# Patient Record
Sex: MALE | Race: WHITE | NOT HISPANIC OR LATINO | URBAN - METROPOLITAN AREA
[De-identification: names, ages, dates, MRNs, and addresses within clinical notes are randomized per-mention and may not be internally consistent; named-entity substitution may affect disease eponyms.]

---

## 2017-06-22 ENCOUNTER — APPOINTMENT (RX ONLY)
Dept: URBAN - METROPOLITAN AREA CLINIC 12 | Facility: CLINIC | Age: 57
Setting detail: DERMATOLOGY
End: 2017-06-22

## 2017-06-22 DIAGNOSIS — D18.0 HEMANGIOMA: ICD-10-CM

## 2017-06-22 DIAGNOSIS — D22 MELANOCYTIC NEVI: ICD-10-CM

## 2017-06-22 DIAGNOSIS — L82.0 INFLAMED SEBORRHEIC KERATOSIS: ICD-10-CM

## 2017-06-22 DIAGNOSIS — L81.4 OTHER MELANIN HYPERPIGMENTATION: ICD-10-CM

## 2017-06-22 PROBLEM — D22.39 MELANOCYTIC NEVI OF OTHER PARTS OF FACE: Status: ACTIVE | Noted: 2017-06-22

## 2017-06-22 PROBLEM — D18.01 HEMANGIOMA OF SKIN AND SUBCUTANEOUS TISSUE: Status: ACTIVE | Noted: 2017-06-22

## 2017-06-22 PROBLEM — D22.72 MELANOCYTIC NEVI OF LEFT LOWER LIMB, INCLUDING HIP: Status: ACTIVE | Noted: 2017-06-22

## 2017-06-22 PROBLEM — D22.5 MELANOCYTIC NEVI OF TRUNK: Status: ACTIVE | Noted: 2017-06-22

## 2017-06-22 PROCEDURE — 11300 SHAVE SKIN LESION 0.5 CM/<: CPT

## 2017-06-22 PROCEDURE — ? LIQUID NITROGEN

## 2017-06-22 PROCEDURE — 17110 DESTRUCTION B9 LES UP TO 14: CPT

## 2017-06-22 PROCEDURE — 99213 OFFICE O/P EST LOW 20 MIN: CPT | Mod: 25

## 2017-06-22 PROCEDURE — ? SHAVE REMOVAL

## 2017-06-22 PROCEDURE — ? COUNSELING

## 2017-06-22 ASSESSMENT — LOCATION DETAILED DESCRIPTION DERM
LOCATION DETAILED: LEFT INFERIOR CENTRAL MALAR CHEEK
LOCATION DETAILED: EPIGASTRIC SKIN
LOCATION DETAILED: LEFT SUPERIOR FOREHEAD
LOCATION DETAILED: RIGHT SUPERIOR PARIETAL SCALP
LOCATION DETAILED: LEFT SUPERIOR PARIETAL SCALP
LOCATION DETAILED: RIGHT CENTRAL MALAR CHEEK
LOCATION DETAILED: SUPERIOR THORACIC SPINE
LOCATION DETAILED: LEFT POSTERIOR ANKLE
LOCATION DETAILED: STERNUM

## 2017-06-22 ASSESSMENT — LOCATION ZONE DERM
LOCATION ZONE: TRUNK
LOCATION ZONE: SCALP
LOCATION ZONE: LEG
LOCATION ZONE: FACE

## 2017-06-22 ASSESSMENT — LOCATION SIMPLE DESCRIPTION DERM
LOCATION SIMPLE: LEFT ANKLE
LOCATION SIMPLE: LEFT CHEEK
LOCATION SIMPLE: UPPER BACK
LOCATION SIMPLE: CHEST
LOCATION SIMPLE: SCALP
LOCATION SIMPLE: ABDOMEN
LOCATION SIMPLE: LEFT FOREHEAD
LOCATION SIMPLE: RIGHT CHEEK

## 2017-06-22 NOTE — PROCEDURE: SHAVE REMOVAL
Billing Type: Third-Party Bill
X Size Of Lesion In Cm (Optional): 0
Bill For Surgical Tray: no
Size Of Margin In Cm (Margins Are Not Added To Billing Dimensions): -
Path Notes (To The Dermatopathologist): Please check margins.
Detail Level: Detailed
Anesthesia Type: 1% lidocaine without epinephrine
Size Of Lesion In Cm (Required): 0.3
Anesthesia Volume In Cc: 0.8
Medical Necessity Clause: Inflamed
Notification Instructions: Patient will be notified of biopsy results. However, patient instructed to call the office if not contacted within 2 weeks.
Hemostasis: Aluminum Chloride
Medical Necessity Information: It is in your best interest to select a reason for this procedure from the list below. All of these items fulfill various CMS LCD requirements except the new and changing color options.
Biopsy Method: Personna blade
Wound Care: Vaseline
Lab: [id number]
Post-Care Instructions: I reviewed with the patient in detail post-care instructions. Patient is to keep the biopsy site dry overnight, and then apply bacitracin twice daily until healed. Patient may apply hydrogen peroxide soaks to remove any crusting.
Consent was obtained from the patient. The risks and benefits to therapy were discussed in detail. Specifically, the risks of infection, scarring, bleeding, prolonged wound healing, incomplete removal, allergy to anesthesia, nerve injury and recurrence were addressed. Prior to the procedure, the treatment site was clearly identified and confirmed by the patient. All components of Universal Protocol/PAUSE Rule completed.

## 2017-06-22 NOTE — PROCEDURE: LIQUID NITROGEN
Number Of Freeze-Thaw Cycles: 2 freeze-thaw cycles
Include Z78.9 (Other Specified Conditions Influencing Health Status) As An Associated Diagnosis?: No
Post-Care Instructions: I reviewed with the patient in detail post-care instructions. Patient is to wear sunprotection, and avoid picking at any of the treated lesions. Pt may apply Vaseline to crusted or scabbing areas. Efudex to hand and scalp
Medical Necessity Clause: .This procedure was medically necessary because the lesion was itchy
Detail Level: Detailed
Consent: The patient's consent was obtained including but not limited to risks of crusting, scabbing, blistering, scarring, darker or lighter pigmentary change, recurrence, incomplete removal and infection.
Medical Necessity Information: It is in your best interest to select a reason for this procedure from the list below. All of these items fulfill various CMS LCD requirements except the new and changing color options.

## 2018-06-28 ENCOUNTER — APPOINTMENT (RX ONLY)
Dept: URBAN - METROPOLITAN AREA CLINIC 12 | Facility: CLINIC | Age: 58
Setting detail: DERMATOLOGY
End: 2018-06-28

## 2018-06-28 DIAGNOSIS — L81.4 OTHER MELANIN HYPERPIGMENTATION: ICD-10-CM

## 2018-06-28 DIAGNOSIS — D18.0 HEMANGIOMA: ICD-10-CM

## 2018-06-28 DIAGNOSIS — L57.0 ACTINIC KERATOSIS: ICD-10-CM

## 2018-06-28 DIAGNOSIS — D22 MELANOCYTIC NEVI: ICD-10-CM

## 2018-06-28 PROBLEM — D22.39 MELANOCYTIC NEVI OF OTHER PARTS OF FACE: Status: ACTIVE | Noted: 2018-06-28

## 2018-06-28 PROBLEM — D22.61 MELANOCYTIC NEVI OF RIGHT UPPER LIMB, INCLUDING SHOULDER: Status: ACTIVE | Noted: 2018-06-28

## 2018-06-28 PROBLEM — D18.01 HEMANGIOMA OF SKIN AND SUBCUTANEOUS TISSUE: Status: ACTIVE | Noted: 2018-06-28

## 2018-06-28 PROBLEM — D22.71 MELANOCYTIC NEVI OF RIGHT LOWER LIMB, INCLUDING HIP: Status: ACTIVE | Noted: 2018-06-28

## 2018-06-28 PROBLEM — D22.5 MELANOCYTIC NEVI OF TRUNK: Status: ACTIVE | Noted: 2018-06-28

## 2018-06-28 PROCEDURE — 99213 OFFICE O/P EST LOW 20 MIN: CPT | Mod: 25

## 2018-06-28 PROCEDURE — 17000 DESTRUCT PREMALG LESION: CPT

## 2018-06-28 PROCEDURE — ? LIQUID NITROGEN

## 2018-06-28 ASSESSMENT — LOCATION ZONE DERM
LOCATION ZONE: SCALP
LOCATION ZONE: LEG
LOCATION ZONE: FACE
LOCATION ZONE: TRUNK
LOCATION ZONE: ARM

## 2018-06-28 ASSESSMENT — LOCATION DETAILED DESCRIPTION DERM
LOCATION DETAILED: STERNUM
LOCATION DETAILED: RIGHT CENTRAL MALAR CHEEK
LOCATION DETAILED: EPIGASTRIC SKIN
LOCATION DETAILED: RIGHT ANTERIOR PROXIMAL THIGH
LOCATION DETAILED: RIGHT PROXIMAL DORSAL FOREARM
LOCATION DETAILED: LEFT SUPERIOR PARIETAL SCALP
LOCATION DETAILED: SUPERIOR THORACIC SPINE
LOCATION DETAILED: RIGHT SUPERIOR PARIETAL SCALP
LOCATION DETAILED: LEFT SUPERIOR CENTRAL MALAR CHEEK
LOCATION DETAILED: LEFT INFERIOR CENTRAL MALAR CHEEK
LOCATION DETAILED: PERIUMBILICAL SKIN
LOCATION DETAILED: LEFT SUPERIOR LATERAL UPPER BACK

## 2018-06-28 ASSESSMENT — LOCATION SIMPLE DESCRIPTION DERM
LOCATION SIMPLE: CHEST
LOCATION SIMPLE: SCALP
LOCATION SIMPLE: RIGHT FOREARM
LOCATION SIMPLE: RIGHT THIGH
LOCATION SIMPLE: RIGHT CHEEK
LOCATION SIMPLE: LEFT CHEEK
LOCATION SIMPLE: LEFT UPPER BACK
LOCATION SIMPLE: UPPER BACK
LOCATION SIMPLE: ABDOMEN

## 2018-06-28 NOTE — PROCEDURE: LIQUID NITROGEN
Post-Care Instructions: I reviewed with the patient in detail post-care instructions. Patient is to wear sunprotection, and avoid picking at any of the treated lesions. Pt may apply Vaseline to crusted or scabbing areas.
Number Of Freeze-Thaw Cycles: 1 freeze-thaw cycle
Detail Level: Simple
Duration Of Freeze Thaw-Cycle (Seconds): 10
Render Post-Care Instructions In Note?: no
Consent: The patient's consent was obtained including but not limited to risks of crusting, scabbing, blistering, scarring, darker or lighter pigmentary change, recurrence, incomplete removal and infection.